# Patient Record
(demographics unavailable — no encounter records)

---

## 2024-10-15 NOTE — IMAGING
[de-identified] : RLE Inspection of the ankle, foot and lower leg is as follows: no abrasions or lacerations, no swelling, no erythema and no gross deformity Palpation of the ankle, foot and lower leg is as follows: Tenderness at plantar fascia insertion. Range of motion of the ankle, foot and lower leg is as follows: dorsiflexion to 20 degrees, plantar flexion to 40 degrees, inversion to 30 degrees and eversion to 20 degrees. Strength testing of the ankle, foot and lower leg is as follows: Ankle dorsiflexion strength is 5/5, Ankle plantar flexion strength is 5/5, Ankle inversion strength is 5/5 and Ankle eversion strength is 5/5. Special Testing of the ankle, foot and lower leg are as follows: no pain with heel compression. Vascular testing of the ankle, foot and lower leg are as follows: Dorsalis Pedis (DP) Pulse is 2+. Sensation testing of the ankle, foot and lower leg are as follows: Sensation present to light touch in all distributions. [Right] : right calcaneus [There are no fractures, subluxations or dislocations. No significant abnormalities are seen] : There are no fractures, subluxations or dislocations. No significant abnormalities are seen

## 2024-10-15 NOTE — HISTORY OF PRESENT ILLNESS
[Full time] : Work status: full time [de-identified] : 10/15/2024: 3 wks plantar heel pain. no injury. no treatment to date. no prior foot probs. denies dm/tob.  [] : Post Surgical Visit: no [FreeTextEntry1] : right heel  [de-identified] :

## 2024-10-15 NOTE — ASSESSMENT
[FreeTextEntry1] : The nature of plantar fasciits was discussed with the patient, as was its typically self-limiting nature. The patient was advised to wear gel heel pads, use nsaids if able, and recommended to see physical therapy for a stretching program. f/up after PT if not resolves

## 2024-10-28 NOTE — HISTORY OF PRESENT ILLNESS
[Full time] : Work status: full time [de-identified] : 10/15/2024: 3 wks plantar heel pain. no injury. no treatment to date. no prior foot probs. denies dm/tob.   10/28/2024:   ongoing pain. has been to 2 sessions PT [] : Post Surgical Visit: no [FreeTextEntry1] : right heel  [de-identified] :

## 2024-10-28 NOTE — ASSESSMENT
[FreeTextEntry1] : The nature of plantar fasciits was discussed with the patient, as was its typically self-limiting nature. The patient was advised to wear gel heel pads, use nsaids if able, and recommended to see physical therapy for a stretching program. f/up after PT if not resolves  rx for mdp  A Medrol dose Robson was prescribed today. Patient understands that while taking the Medrol, they shouldn't be taking any other anti-inflammatories. Pt understands and all questions were answered.

## 2024-10-28 NOTE — IMAGING
[Right] : right calcaneus [There are no fractures, subluxations or dislocations. No significant abnormalities are seen] : There are no fractures, subluxations or dislocations. No significant abnormalities are seen [de-identified] : RLE Inspection of the ankle, foot and lower leg is as follows: no abrasions or lacerations, no swelling, no erythema and no gross deformity Palpation of the ankle, foot and lower leg is as follows: Tenderness at plantar fascia insertion. Range of motion of the ankle, foot and lower leg is as follows: dorsiflexion to 20 degrees, plantar flexion to 40 degrees, inversion to 30 degrees and eversion to 20 degrees. Strength testing of the ankle, foot and lower leg is as follows: Ankle dorsiflexion strength is 5/5, Ankle plantar flexion strength is 5/5, Ankle inversion strength is 5/5 and Ankle eversion strength is 5/5. Special Testing of the ankle, foot and lower leg are as follows: no pain with heel compression. Vascular testing of the ankle, foot and lower leg are as follows: Dorsalis Pedis (DP) Pulse is 2+. Sensation testing of the ankle, foot and lower leg are as follows: Sensation present to light touch in all distributions.